# Patient Record
Sex: FEMALE | Race: WHITE | Employment: STUDENT | URBAN - METROPOLITAN AREA
[De-identification: names, ages, dates, MRNs, and addresses within clinical notes are randomized per-mention and may not be internally consistent; named-entity substitution may affect disease eponyms.]

---

## 2017-03-16 ENCOUNTER — TELEPHONE (OUTPATIENT)
Dept: FAMILY MEDICINE CLINIC | Facility: CLINIC | Age: 27
End: 2017-03-16

## 2017-03-16 NOTE — TELEPHONE ENCOUNTER
Patient is compliaining of having fluid in ears for 2 weeks. Patient states she was taking zyrtec because she thinks its allergy related. Not working. Patient denies pain.  Please advise Stephany Herrera, 03/16/2017, 10:20 AM

## 2017-03-16 NOTE — TELEPHONE ENCOUNTER
I would recommend Sudafed from behind the counter.  NOT the Sudafed PE off-the-shelf    She could also try Flonase or Nasonex or Rhinocort–2 sprays each nostril once a day it will take a few days to work continue taking for about 2 weeks unless she gets a n

## 2017-07-28 ENCOUNTER — TELEPHONE (OUTPATIENT)
Dept: FAMILY MEDICINE CLINIC | Facility: CLINIC | Age: 27
End: 2017-07-28

## 2017-07-28 NOTE — TELEPHONE ENCOUNTER
Immunizations entered, left at  for . Pt was informed that she is due for for her annual exam. Pt is due for updted Tdap and 2nd Hep A  Pt verbalized understanding.

## 2017-08-09 ENCOUNTER — TELEPHONE (OUTPATIENT)
Dept: FAMILY MEDICINE CLINIC | Facility: CLINIC | Age: 27
End: 2017-08-09

## 2017-08-09 NOTE — TELEPHONE ENCOUNTER
Centricity chart reviewed shows the patient was here for physical in February 2016 and we did discuss her tetanus shot–patient thought she may have gotten shot elsewhere.   If she has not had the shot then you she is due for a Boostrix (Tdap) however she is

## 2017-08-10 NOTE — TELEPHONE ENCOUNTER
Patient states she sees her gyne for physcials. Does not do them here anymore.    Nurse appt made for boostrix Expressed understanding

## 2017-08-14 ENCOUNTER — TELEPHONE (OUTPATIENT)
Dept: FAMILY MEDICINE CLINIC | Facility: CLINIC | Age: 27
End: 2017-08-14

## 2017-08-14 ENCOUNTER — NURSE ONLY (OUTPATIENT)
Dept: FAMILY MEDICINE CLINIC | Facility: CLINIC | Age: 27
End: 2017-08-14

## 2017-08-14 PROCEDURE — 90471 IMMUNIZATION ADMIN: CPT | Performed by: NURSE PRACTITIONER

## 2017-08-14 PROCEDURE — 90715 TDAP VACCINE 7 YRS/> IM: CPT | Performed by: NURSE PRACTITIONER

## 2017-08-14 NOTE — PROGRESS NOTES
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 Pt instructed to make appt -pt verbalized understanding.       Future Appointments  Date Time Provider [place]   8/16/2017 9:30 AM ALEJANDRA [facility]id [facility]

## 2017-08-14 NOTE — TELEPHONE ENCOUNTER
Needs immunizations faxed to [address] [place] [address] to the attention of the Immunization Office. Fax number is [phone].

## 2017-08-16 ENCOUNTER — OFFICE VISIT (OUTPATIENT)
Dept: FAMILY MEDICINE CLINIC | Facility: CLINIC | Age: 27
End: 2017-08-16

## 2017-08-16 ENCOUNTER — APPOINTMENT (OUTPATIENT)
Dept: LAB | Age: 27
End: 2017-08-16
Attending: NURSE PRACTITIONER
Payer: COMMERCIAL

## 2017-08-16 VITALS
WEIGHT: 293 LBS | OXYGEN SATURATION: 98 % | DIASTOLIC BLOOD PRESSURE: 78 MMHG | HEART RATE: 82 BPM | TEMPERATURE: 99 F | SYSTOLIC BLOOD PRESSURE: 126 MMHG

## 2017-08-16 DIAGNOSIS — Z00.00 ENCOUNTER FOR HEALTH MAINTENANCE EXAMINATION IN ADULT: Primary | ICD-10-CM

## 2017-08-16 PROBLEM — J45.909 ASTHMA: Status: ACTIVE | Noted: 2017-08-16

## 2017-08-16 LAB
ALBUMIN SERPL-MCNC: 3.4 G/DL (ref 3.5–4.8)
ALP LIVER SERPL-CCNC: 51 U/L (ref 37–98)
ALT SERPL-CCNC: 24 U/L (ref 14–54)
AST SERPL-CCNC: 14 U/L (ref 15–41)
BASOPHILS # BLD AUTO: 0.03 X10(3) UL (ref 0–0.1)
BASOPHILS NFR BLD AUTO: 0.3 %
BILIRUB SERPL-MCNC: 0.4 MG/DL (ref 0.1–2)
BUN BLD-MCNC: 11 MG/DL (ref 8–20)
CALCIUM BLD-MCNC: 9.3 MG/DL (ref 8.3–10.3)
CHLORIDE: 109 MMOL/L (ref 101–111)
CHOLEST SMN-MCNC: 173 MG/DL (ref ?–200)
CO2: 28 MMOL/L (ref 22–32)
CREAT BLD-MCNC: 0.83 MG/DL (ref 0.55–1.02)
EOSINOPHIL # BLD AUTO: 0.37 X10(3) UL (ref 0–0.3)
EOSINOPHIL NFR BLD AUTO: 3.9 %
ERYTHROCYTE [DISTWIDTH] IN BLOOD BY AUTOMATED COUNT: 16.1 % (ref 11.5–16)
FREE T4: 1 NG/DL (ref 0.9–1.8)
GLUCOSE BLD-MCNC: 85 MG/DL (ref 70–99)
HCT VFR BLD AUTO: 40.2 % (ref 34–50)
HDLC SERPL-MCNC: 53 MG/DL (ref 45–?)
HDLC SERPL: 3.26 {RATIO} (ref ?–4.44)
HGB BLD-MCNC: 12.6 G/DL (ref 12–16)
IMMATURE GRANULOCYTE COUNT: 0.03 X10(3) UL (ref 0–1)
IMMATURE GRANULOCYTE RATIO %: 0.3 %
LDLC SERPL CALC-MCNC: 97 MG/DL (ref ?–130)
LDLC SERPL-MCNC: 23 MG/DL (ref 5–40)
LYMPHOCYTES # BLD AUTO: 2.14 X10(3) UL (ref 0.9–4)
LYMPHOCYTES NFR BLD AUTO: 22.5 %
M PROTEIN MFR SERPL ELPH: 7.4 G/DL (ref 6.1–8.3)
MCH RBC QN AUTO: 26.6 PG (ref 27–33.2)
MCHC RBC AUTO-ENTMCNC: 31.3 G/DL (ref 31–37)
MCV RBC AUTO: 84.8 FL (ref 81–100)
MONOCYTES # BLD AUTO: 0.61 X10(3) UL (ref 0.1–0.6)
MONOCYTES NFR BLD AUTO: 6.4 %
NEUTROPHIL ABS PRELIM: 6.34 X10 (3) UL (ref 1.3–6.7)
NEUTROPHILS # BLD AUTO: 6.34 X10(3) UL (ref 1.3–6.7)
NEUTROPHILS NFR BLD AUTO: 66.6 %
NONHDLC SERPL-MCNC: 120 MG/DL (ref ?–130)
PLATELET # BLD AUTO: 287 10(3)UL (ref 150–450)
POTASSIUM SERPL-SCNC: 4.6 MMOL/L (ref 3.6–5.1)
RBC # BLD AUTO: 4.74 X10(6)UL (ref 3.8–5.1)
RED CELL DISTRIBUTION WIDTH-SD: 50.2 FL (ref 35.1–46.3)
SODIUM SERPL-SCNC: 142 MMOL/L (ref 136–144)
TRIGLYCERIDES: 115 MG/DL (ref ?–150)
TSI SER-ACNC: 4.13 MIU/ML (ref 0.35–5.5)
WBC # BLD AUTO: 9.5 X10(3) UL (ref 4–13)

## 2017-08-16 PROCEDURE — 36415 COLL VENOUS BLD VENIPUNCTURE: CPT | Performed by: NURSE PRACTITIONER

## 2017-08-16 PROCEDURE — 80050 GENERAL HEALTH PANEL: CPT | Performed by: NURSE PRACTITIONER

## 2017-08-16 PROCEDURE — 80061 LIPID PANEL: CPT | Performed by: NURSE PRACTITIONER

## 2017-08-16 PROCEDURE — 84439 ASSAY OF FREE THYROXINE: CPT | Performed by: NURSE PRACTITIONER

## 2017-08-16 PROCEDURE — 99395 PREV VISIT EST AGE 18-39: CPT | Performed by: NURSE PRACTITIONER

## 2017-08-16 RX ORDER — ALBUTEROL SULFATE 90 UG/1
AEROSOL, METERED RESPIRATORY (INHALATION)
COMMUNITY
Start: 2012-01-01 | End: 2019-03-16

## 2017-08-16 RX ORDER — MEFENAMIC ACID 250 MG/1
CAPSULE ORAL
Refills: 1 | COMMUNITY
Start: 2017-07-21 | End: 2019-02-09 | Stop reason: ALTCHOICE

## 2017-08-16 RX ORDER — ETONOGESTREL/ETHINYL ESTRADIOL .12-.015MG
RING, VAGINAL VAGINAL
Refills: 0 | COMMUNITY
Start: 2017-06-09 | End: 2021-01-05

## 2017-08-16 RX ORDER — NORETHINDRONE ACETATE AND ETHINYL ESTRADIOL 1.5-30(21)
KIT ORAL
COMMUNITY
Start: 2016-02-12 | End: 2017-08-16

## 2017-08-16 NOTE — PATIENT INSTRUCTIONS
Fasting blood work today. Tetanus shot is up-to-date. I do recommend a flu shot in the fall. Recommend healthy diet, exercise, and weight loss.

## 2017-08-17 ENCOUNTER — TELEPHONE (OUTPATIENT)
Dept: FAMILY MEDICINE CLINIC | Facility: CLINIC | Age: 27
End: 2017-08-17

## 2017-08-17 NOTE — TELEPHONE ENCOUNTER
Left detailed message of the below results. Asked for a c/b if any questions/concerns.  Tressa Sinha, 08/17/17, 8:34 AM

## 2017-08-17 NOTE — PROGRESS NOTES
HPI:    Patient ID: Lita Gonsalez is a 31year old female. HPI patient is here for her annual physical.  States that she had her Pap smear in March with Dr. Griselda Lindsey.   Patient had had a D&C due to continuing uterine bleeding states a polyp was removed from discharge. Left eye exhibits no discharge. Neck: Trachea normal and normal range of motion. Neck supple. No tracheal deviation present. No thyroid mass and no thyromegaly present.    Cardiovascular: Normal rate, regular rhythm, S1 normal, S2 normal, isela Visit:    No prescriptions requested or ordered in this encounter       Imaging & Referrals:  None    Patient Instructions   Fasting blood work today. Tetanus shot is up-to-date. I do recommend a flu shot in the fall.     Recommend healthy diet, exercis

## 2017-08-17 NOTE — TELEPHONE ENCOUNTER
----- Message from ALEJANDRA Lutz sent at 8/17/2017  8:29 AM CDT -----  Blood work is all within normal limits–blood sugar is good, cholesterol is good, thyroid is good. The slight abnormalities on the CBC are not concerning at this time.

## 2017-08-20 ENCOUNTER — PATIENT MESSAGE (OUTPATIENT)
Dept: FAMILY MEDICINE CLINIC | Facility: CLINIC | Age: 27
End: 2017-08-20

## 2017-08-21 NOTE — TELEPHONE ENCOUNTER
From: Jessica Lawrence  To: ALEJANDRA Shah  Sent: 8/20/2017 4:48 PM CDT  Subject: Non-Urgent Medical Question    What is my blood type?

## 2019-02-09 ENCOUNTER — OFFICE VISIT (OUTPATIENT)
Dept: FAMILY MEDICINE CLINIC | Facility: CLINIC | Age: 29
End: 2019-02-09
Payer: COMMERCIAL

## 2019-02-09 VITALS
BODY MASS INDEX: 51.91 KG/M2 | SYSTOLIC BLOOD PRESSURE: 102 MMHG | DIASTOLIC BLOOD PRESSURE: 70 MMHG | WEIGHT: 293 LBS | HEART RATE: 92 BPM | HEIGHT: 63 IN | TEMPERATURE: 97 F | RESPIRATION RATE: 18 BRPM

## 2019-02-09 DIAGNOSIS — Z00.00 ENCOUNTER FOR HEALTH MAINTENANCE EXAMINATION IN ADULT: Primary | ICD-10-CM

## 2019-02-09 PROBLEM — J45.909 ASTHMA: Status: RESOLVED | Noted: 2017-08-16 | Resolved: 2019-02-09

## 2019-02-09 PROCEDURE — 99395 PREV VISIT EST AGE 18-39: CPT | Performed by: NURSE PRACTITIONER

## 2019-02-09 NOTE — PROGRESS NOTES
HPI:    Patient ID: Rosalina Dandy is a 33year old female. HPI patient presents today for an annual physical exam.  States she sees a gynecologist and had a Pap smear in October 2018. Patient also states that she had a flu shot through her work.   She is Left Ear: Hearing, tympanic membrane, external ear and ear canal normal.   Nose: Nose normal.   Mouth/Throat: Oropharynx is clear and moist and mucous membranes are normal. Normal dentition. No oropharyngeal exudate.    Eyes: Conjunctivae and lids are nor [E]      Lipid Panel [E]      TSH and Free T4 [E]      Meds This Visit:  Requested Prescriptions      No prescriptions requested or ordered in this encounter       Imaging & Referrals:  None    Patient Instructions   Go to [facility] for fasting blood work.

## 2019-02-10 LAB
ABSOLUTE BASOPHILS: 64 CELLS/UL (ref 0–200)
ABSOLUTE EOSINOPHILS: 321 CELLS/UL (ref 15–500)
ABSOLUTE LYMPHOCYTES: 2718 CELLS/UL (ref 850–3900)
ABSOLUTE MONOCYTES: 653 CELLS/UL (ref 200–950)
ABSOLUTE NEUTROPHILS: 6944 CELLS/UL (ref 1500–7800)
ALBUMIN/GLOBULIN RATIO: 1.5 (CALC) (ref 1–2.5)
ALBUMIN: 4.1 G/DL (ref 3.6–5.1)
ALKALINE PHOSPHATASE: 46 U/L (ref 33–115)
ALT: 27 U/L (ref 6–29)
AST: 15 U/L (ref 10–30)
BASOPHILS: 0.6 %
BILIRUBIN, TOTAL: 0.4 MG/DL (ref 0.2–1.2)
BUN: 15 MG/DL (ref 7–25)
CALCIUM: 9.6 MG/DL (ref 8.6–10.2)
CARBON DIOXIDE: 24 MMOL/L (ref 20–32)
CHLORIDE: 108 MMOL/L (ref 98–110)
CHOL/HDLC RATIO: 3.6 (CALC)
CHOLESTEROL, TOTAL: 193 MG/DL
CREATININE: 0.87 MG/DL (ref 0.5–1.1)
EGFR IF AFRICN AM: 104 ML/MIN/1.73M2
EGFR IF NONAFRICN AM: 90 ML/MIN/1.73M2
EOSINOPHILS: 3 %
GLOBULIN: 2.8 G/DL (CALC) (ref 1.9–3.7)
GLUCOSE: 90 MG/DL (ref 65–99)
HDL CHOLESTEROL: 54 MG/DL
HEMATOCRIT: 41.6 % (ref 35–45)
HEMOGLOBIN: 13.3 G/DL (ref 11.7–15.5)
LDL-CHOLESTEROL: 120 MG/DL (CALC)
LYMPHOCYTES: 25.4 %
MCH: 27.3 PG (ref 27–33)
MCHC: 32 G/DL (ref 32–36)
MCV: 85.2 FL (ref 80–100)
MONOCYTES: 6.1 %
MPV: 10.9 FL (ref 7.5–12.5)
NEUTROPHILS: 64.9 %
NON-HDL CHOLESTEROL: 139 MG/DL (CALC)
PLATELET COUNT: 303 THOUSAND/UL (ref 140–400)
POTASSIUM: 4.6 MMOL/L (ref 3.5–5.3)
PROTEIN, TOTAL: 6.9 G/DL (ref 6.1–8.1)
RDW: 13.7 % (ref 11–15)
RED BLOOD CELL COUNT: 4.88 MILLION/UL (ref 3.8–5.1)
SODIUM: 141 MMOL/L (ref 135–146)
T4, FREE: 1.1 NG/DL (ref 0.8–1.8)
TRIGLYCERIDES: 89 MG/DL
TSH: 2.56 MIU/L
WHITE BLOOD CELL COUNT: 10.7 THOUSAND/UL (ref 3.8–10.8)

## 2019-03-16 ENCOUNTER — TELEPHONE (OUTPATIENT)
Dept: FAMILY MEDICINE CLINIC | Facility: CLINIC | Age: 29
End: 2019-03-16

## 2019-03-16 RX ORDER — ALBUTEROL SULFATE 90 UG/1
AEROSOL, METERED RESPIRATORY (INHALATION)
Qty: 1 INHALER | Refills: 0 | Status: SHIPPED | OUTPATIENT
Start: 2019-03-16 | End: 2021-01-05

## 2019-03-16 NOTE — TELEPHONE ENCOUNTER
Future appt:    Last Appointment:  Visit date not found  CHOLESTEROL, TOTAL (mg/dL)   Date Value   02/09/2019 193     HDL CHOLESTEROL (mg/dL)   Date Value   02/09/2019 54     LDL-CHOLESTEROL (mg/dL (calc))   Date Value   02/09/2019 120 (H)     TRIGLYCERIDE

## 2019-03-18 ENCOUNTER — OFFICE VISIT (OUTPATIENT)
Dept: FAMILY MEDICINE CLINIC | Facility: CLINIC | Age: 29
End: 2019-03-18
Payer: COMMERCIAL

## 2019-03-18 VITALS
DIASTOLIC BLOOD PRESSURE: 82 MMHG | HEIGHT: 63 IN | SYSTOLIC BLOOD PRESSURE: 118 MMHG | WEIGHT: 290.13 LBS | BODY MASS INDEX: 51.41 KG/M2 | TEMPERATURE: 97 F | OXYGEN SATURATION: 98 % | HEART RATE: 98 BPM | RESPIRATION RATE: 18 BRPM

## 2019-03-18 DIAGNOSIS — J20.9 BRONCHITIS WITH BRONCHOSPASM: Primary | ICD-10-CM

## 2019-03-18 PROCEDURE — 99214 OFFICE O/P EST MOD 30 MIN: CPT | Performed by: NURSE PRACTITIONER

## 2019-03-18 RX ORDER — AZITHROMYCIN 500 MG/1
500 TABLET, FILM COATED ORAL DAILY
Qty: 7 TABLET | Refills: 0 | Status: SHIPPED | OUTPATIENT
Start: 2019-03-18 | End: 2019-03-25

## 2019-03-18 RX ORDER — PREDNISONE 20 MG/1
20 TABLET ORAL 2 TIMES DAILY
Qty: 10 TABLET | Refills: 0 | Status: SHIPPED | OUTPATIENT
Start: 2019-03-18 | End: 2019-03-23

## 2020-06-05 ENCOUNTER — TELEPHONE (OUTPATIENT)
Dept: FAMILY MEDICINE CLINIC | Facility: CLINIC | Age: 30
End: 2020-06-05

## 2020-06-05 DIAGNOSIS — Z00.00 ENCOUNTER FOR HEALTH MAINTENANCE EXAMINATION IN ADULT: Primary | ICD-10-CM

## 2020-06-05 NOTE — TELEPHONE ENCOUNTER
Future Appointments   Date Time Provider [facility]   6/25/2020  8:00 AM BECKY Byrne [facility] [place] [place]     Please advise lab orders.

## 2020-06-06 NOTE — TELEPHONE ENCOUNTER
Orders for fasting blood work [facility] as [facility] [place].  Please also let patient know that because her blood work was normal last year she does not need to do blood work this year.   She can do it–but because everyth

## 2021-01-05 ENCOUNTER — OFFICE VISIT (OUTPATIENT)
Dept: FAMILY MEDICINE CLINIC | Facility: CLINIC | Age: 31
End: 2021-01-05
Payer: COMMERCIAL

## 2021-01-05 VITALS
WEIGHT: 293 LBS | BODY MASS INDEX: 51.27 KG/M2 | TEMPERATURE: 98 F | HEART RATE: 70 BPM | OXYGEN SATURATION: 97 % | HEIGHT: 63.5 IN | RESPIRATION RATE: 18 BRPM | DIASTOLIC BLOOD PRESSURE: 80 MMHG | SYSTOLIC BLOOD PRESSURE: 128 MMHG

## 2021-01-05 DIAGNOSIS — H66.002 NON-RECURRENT ACUTE SUPPURATIVE OTITIS MEDIA OF LEFT EAR WITHOUT SPONTANEOUS RUPTURE OF TYMPANIC MEMBRANE: ICD-10-CM

## 2021-01-05 DIAGNOSIS — Z00.00 ENCOUNTER FOR HEALTH MAINTENANCE EXAMINATION IN ADULT: Primary | ICD-10-CM

## 2021-01-05 DIAGNOSIS — E66.01 CLASS 3 SEVERE OBESITY DUE TO EXCESS CALORIES WITH BODY MASS INDEX (BMI) OF 60.0 TO 69.9 IN ADULT, UNSPECIFIED WHETHER SERIOUS COMORBIDITY PRESENT (HCC): ICD-10-CM

## 2021-01-05 PROCEDURE — 3008F BODY MASS INDEX DOCD: CPT | Performed by: NURSE PRACTITIONER

## 2021-01-05 PROCEDURE — 3074F SYST BP LT 130 MM HG: CPT | Performed by: NURSE PRACTITIONER

## 2021-01-05 PROCEDURE — 3079F DIAST BP 80-89 MM HG: CPT | Performed by: NURSE PRACTITIONER

## 2021-01-05 PROCEDURE — 99395 PREV VISIT EST AGE 18-39: CPT | Performed by: NURSE PRACTITIONER

## 2021-01-05 RX ORDER — SPIRONOLACTONE 100 MG/1
100 TABLET, FILM COATED ORAL DAILY
COMMUNITY
Start: 2020-12-14

## 2021-01-05 RX ORDER — AMOXICILLIN AND CLAVULANATE POTASSIUM 875; 125 MG/1; MG/1
1 TABLET, FILM COATED ORAL 2 TIMES DAILY
Qty: 20 TABLET | Refills: 0 | Status: SHIPPED | OUTPATIENT
Start: 2021-01-05 | End: 2021-01-15

## 2021-01-05 RX ORDER — LORATADINE 10 MG/1
CAPSULE, LIQUID FILLED ORAL
COMMUNITY
Start: 2020-12-30

## 2021-01-05 NOTE — PROGRESS NOTES
HPI:    Patient ID: Grecia Escobar is a 31year old female.     HPI  Was doing really well on Keto-  Can't sustain   Was eating healthy and going to the gym then covid happened and she has not been able to work out-   Pap 10/2019  Patient would like to try CY Mortensen not erythematous. Nose: Nose normal.   Mouth/Throat: Oropharynx is clear and moist and mucous membranes are normal. Normal dentition. No oropharyngeal exudate. Eyes: Pupils are equal, round, and reactive to light.  Conjunctivae and lids are normal. Righ comorbidity present (hcc)  Non-recurrent acute suppurative otitis media of left ear without spontaneous rupture of tympanic membrane    No orders of the defined types were placed in this encounter.       Meds This Visit:  Requested Prescriptions     Signed

## (undated) NOTE — LETTER
Date: 3/18/2019    Patient Name: Aristides Rubin          To Whom it may concern: This letter has been written at the patient's request. The above patient was seen at the [facility] for treatment of a medical condition.     This patient should